# Patient Record
(demographics unavailable — no encounter records)

---

## 2025-04-09 NOTE — HISTORY OF PRESENT ILLNESS
[Sudden] : sudden [Leisure] : leisure [Sleep] : sleep [Rest] : rest [Lying in bed] : lying in bed [de-identified] : 47 year old RHD female with pain in the right shoulder, symptoms started three weeks ago, she feels she did something at the gym top hurt the shoulder. Pain with reaching over head, behind back, sleeping, lifting. NO radicular complaints. Occasionally it clicks and makes noise. She has been doing gentle ROM and working with the , remains symptomatic. Tiger Balm topically with only mild help. No prior history of trouble with the shoulder. Certain motions make her feel worse [] : no [FreeTextEntry1] : Right Shoulder [FreeTextEntry3] : 3 weeks [FreeTextEntry5] : Patient started to feel pain in the shoulder about 3 weeks ago. Patient notes the pain has made it difficult to sleep and complete daily tasks. [de-identified] : reaching

## 2025-04-09 NOTE — PHYSICAL EXAM
[Sitting] : sitting [Moderate] : moderate [5___] : external rotation 5[unfilled]/5 [Right] : right shoulder [There are no fractures, subluxations or dislocations. No significant abnormalities are seen] : There are no fractures, subluxations or dislocations. No significant abnormalities are seen [] : negative shoulder apprehension [TWNoteComboBox7] : active forward flexion 160 degrees [TWNoteComboBox6] : internal rotation L3 [de-identified] : external rotation 60 degrees

## 2025-04-09 NOTE — DISCUSSION/SUMMARY
[de-identified] : modify activities try OTC meds ice as needed try topical lidocaine for pain control reviewed current medications used by this patient home exercises for functional return  RE:  FRANCESCA DRAPER   Acct #- [id number]   Attention:  Nurse Reviewer /Medical Director    Based on my patient's condition, I strongly believe that the MRI  R shoulder is medically.necessary.   The patient has failed oral meds, injections and PT and conservative treatment in combination or by themselves and therefore needs the MRI.   The MRI will dictate further treatment t recommendations.

## 2025-04-23 NOTE — DISCUSSION/SUMMARY
[de-identified] : Patient allowed to gently start resuming activities. Discussed change to medication prescription and usage. Bracing options discussed with patient for stability and support. Activity modification as needed Discussed poss future surgery, pt deciding, questions answered, no guarantees AS R shoulder RCR, labral repair and sad try topical lidocaine for pain control reviewed current medications used by this patient Home exercises for functional return 04/23/2025    RE:  FRANCESCA DRAPER   Acct #- [id number]    Attention:  Nurse Reviewer /Medical Director  I am writing this letter as a medical necessity for PT program. Patient has tried analgesics, non-steroid anti-inflammatory agents,  hot or cold compresses,injections of corticosteroids, etc)  which in combination or by themselves has not worked. Based on my patient's condition, I strongly believe that the PT is medically needed.   Thank you for your time and consideration.

## 2025-04-23 NOTE — DATA REVIEWED
[MRI] : MRI [Right] : of the right [Shoulder] : shoulder [Report was reviewed and noted in the chart] : The report was reviewed and noted in the chart [FreeTextEntry1] : full thickness tear SS, AC arthrosis, fraying superior labrum, biceps tendintiis

## 2025-04-23 NOTE — PHYSICAL EXAM
[Right] : right shoulder [Sitting] : sitting [Moderate] : moderate [5___] : external rotation 5[unfilled]/5 [] : negative shoulder apprehension [TWNoteComboBox7] : active forward flexion 160 degrees [TWNoteComboBox6] : internal rotation L3 [de-identified] : external rotation 60 degrees

## 2025-04-23 NOTE — HISTORY OF PRESENT ILLNESS
[Sudden] : sudden [Leisure] : leisure [Sleep] : sleep [Rest] : rest [Lying in bed] : lying in bed [de-identified] : Patient has persistent symptoms in the right shoulder, symptoms started at the gym over a month ago. Has not tried MRI [] : no [FreeTextEntry1] : Right Shoulder [FreeTextEntry3] : 3 weeks [de-identified] : reaching

## 2025-05-27 NOTE — DISCUSSION/SUMMARY
[de-identified] : Patient allowed to gently start resuming activities. Discussed change to medication prescription and usage. Activity modification as needed Discussed poss future surgery, AS R shoulder RCR, poss labral repair and sad try topical lidocaine for pain control reviewed current medications used by this patient Home exercises for functional return poss csi

## 2025-05-27 NOTE — PHYSICAL EXAM
[Right] : right shoulder [Sitting] : sitting [Moderate] : moderate [5___] : external rotation 5[unfilled]/5 [] : negative shoulder apprehension [TWNoteComboBox7] : active forward flexion 160 degrees [TWNoteComboBox6] : internal rotation L3 [de-identified] : external rotation 60 degrees

## 2025-05-27 NOTE — HISTORY OF PRESENT ILLNESS
[Sudden] : sudden [Leisure] : leisure [Sleep] : sleep [Rest] : rest [Lying in bed] : lying in bed [de-identified] : Patient has persistent symptoms in the right shoulder, symptoms started a few months ago. Has RTC tear and IS, had flare this weekend after raced herself against wall to keep from falling and  felt worse [] : no [FreeTextEntry1] : Right Shoulder [FreeTextEntry3] : 3 weeks [FreeTextEntry5] : Patient tweaked the shoulder last friday. [de-identified] : reaching